# Patient Record
Sex: FEMALE | Race: WHITE | NOT HISPANIC OR LATINO | Employment: UNEMPLOYED | URBAN - NONMETROPOLITAN AREA
[De-identification: names, ages, dates, MRNs, and addresses within clinical notes are randomized per-mention and may not be internally consistent; named-entity substitution may affect disease eponyms.]

---

## 2018-06-12 ENCOUNTER — TELEMEDICINE ORIGINATING SITE VISIT (OUTPATIENT)
Dept: MEDICAL GROUP | Facility: CLINIC | Age: 53
End: 2018-06-12
Payer: MEDICAID

## 2018-06-12 ENCOUNTER — TELEMEDICINE2 (OUTPATIENT)
Dept: MEDICAL GROUP | Facility: PHYSICIAN GROUP | Age: 53
End: 2018-06-12
Payer: MEDICAID

## 2018-06-12 VITALS
HEIGHT: 62 IN | SYSTOLIC BLOOD PRESSURE: 103 MMHG | WEIGHT: 127 LBS | OXYGEN SATURATION: 91 % | BODY MASS INDEX: 23.37 KG/M2 | HEART RATE: 99 BPM | DIASTOLIC BLOOD PRESSURE: 70 MMHG | RESPIRATION RATE: 16 BRPM | TEMPERATURE: 97.5 F

## 2018-06-12 DIAGNOSIS — R63.5 WEIGHT GAIN: ICD-10-CM

## 2018-06-12 DIAGNOSIS — Z78.0 MENOPAUSE: ICD-10-CM

## 2018-06-12 DIAGNOSIS — Z00.00 HEALTHCARE MAINTENANCE: ICD-10-CM

## 2018-06-12 DIAGNOSIS — J04.0 LARYNGITIS: ICD-10-CM

## 2018-06-12 DIAGNOSIS — I10 ESSENTIAL HYPERTENSION: ICD-10-CM

## 2018-06-12 DIAGNOSIS — R42 LIGHTHEADEDNESS: ICD-10-CM

## 2018-06-12 PROCEDURE — 99204 OFFICE O/P NEW MOD 45 MIN: CPT | Performed by: NURSE PRACTITIONER

## 2018-06-12 RX ORDER — LISINOPRIL AND HYDROCHLOROTHIAZIDE 25; 20 MG/1; MG/1
1 TABLET ORAL DAILY
COMMUNITY
End: 2018-09-07 | Stop reason: SDUPTHER

## 2018-06-12 RX ORDER — AMOXICILLIN 500 MG/1
500 CAPSULE ORAL 3 TIMES DAILY
Qty: 30 CAP | Refills: 0 | OUTPATIENT
Start: 2018-06-12 | End: 2018-10-15

## 2018-06-12 ASSESSMENT — PATIENT HEALTH QUESTIONNAIRE - PHQ9
CLINICAL INTERPRETATION OF PHQ2 SCORE: 6
SUM OF ALL RESPONSES TO PHQ QUESTIONS 1-9: 14
5. POOR APPETITE OR OVEREATING: 2 - MORE THAN HALF THE DAYS

## 2018-06-12 NOTE — ASSESSMENT & PLAN NOTE
Patient states that she feels hungry all the time, and if she does not eat she will become lightheaded.  She has had weight gain.  And just feeling that she is not herself.

## 2018-06-12 NOTE — ASSESSMENT & PLAN NOTE
Patient is due for mammogram and Pap smear and has never had a colonoscopy willing to do a fit test though.

## 2018-06-12 NOTE — PROGRESS NOTES
Chief Complaint   Patient presents with   • Laryngitis       HISTORY OF PRESENT ILLNESS: Patient is a 52 y.o. female [facility]  Patient, who presents today to discuss:  Verified Identification with patient.  Secured video conference with RN presenter in [place], [place]        Essential hypertension  Patient was identified as having hypertension was treated with lisinopril hydrochlorothiazide, but did not go back for labs and no more refills of her medication were authorized.Symptoms:  Headache no , Weakness no, Visual loss , Chest pain no, Dyspnea no, Claudication no swelling no  Taking medication lisinopril - Hctz  Diet - standard  diet   Exercise - limited Bia Perrin APRBANG       Laryngitis  Patient states that she has had on and off laryngitis for months.  But most recently the laryngitis has turned into a burning fiery pain in the back of her throat causing her to lose her voice and having difficulty swallowing.  Patient is a current smoker for over 30 years.  She was once told that it was purely allergies so she has been taking Claritin on a daily basis    Weight gain  Patient is quite concerned that she has had a 10 pound weight gain over the last year and her eating habits have not changed.    Healthcare maintenance  Patient is due for mammogram and Pap smear and has never had a colonoscopy willing to do a fit test though.    Lightheadedness  Patient states that she feels hungry all the time, and if she does not eat she will become lightheaded.  She has had weight gain.  And just feeling that she is not herself.        Allergies:Erythromycin and Prednisone    Current Outpatient Prescriptions Ordered in [facility]   Medication Sig Dispense Refill   • lisinopril-hydrochlorothiazide (PRINZIDE, ZESTORETIC) 20-25 MG per tablet Take 1 Tab by mouth every day.     • amoxicillin (AMOXIL) 500 MG Cap Take 1 Cap by mouth 3 times a day. 30 Cap 0     No current Epic-ordered facility-administered medications on file.        Past  "Medical History:   Diagnosis Date   • Hypertension        Social History   Substance Use Topics   • Smoking status: Current Every Day Smoker     Packs/day: 1.00     Years: 35.00     Types: Cigarettes   • Smokeless tobacco: Never Used   • Alcohol use 1.2 oz/week     2 Shots of liquor per week       Family Status   Relation Status   • Mother    • Father      Family History   Problem Relation Age of Onset   • Cancer Mother      lung    • Cancer Father      brain        Review of Systems:  Allergic/Immunologic : Denies persistent infections, strong allergic reactions or hives  Cardiovascular: Denies chest pain or irregular heartbeat and palpitations, syncope HAVING LIGHTHEADINESS  Constitutional: Denies fever, fatigue, loss of appetite, weight gain, weight loss.  ENMT: Denies nosebleeds sore throat postnasal drip choking episodes.  Severe laryngitis and burning of the throat  Endocrine: Denies excessive thirst, hair loss, heat intolerance  Eyes: Denies yellow discoloration or visual changes  Gastrointestinal: Denies heartburn dysphasia abdominal pain, nausea, vomiting, abdominal swelling, change in bowel habits, constipation, diarrhea, fecal incontinence, rectal bleeding, gas, or jaundice  Sexual: Erectile dysfunction, decreased libido painful intercourse.  Hematologic/lymphatic denies easy bruising or prolonged bleeding  Musculoskeletal: Denies joint pain, muscle weakness daily use of over-the-counter medications for pain or prescription medications for pain  Psychiatry: Denies anxiety or depression or treatment for mental health disorder  Respiratory: Denies cough, dyspnea, hemoptysis, or wheezing        Exam:  Blood pressure 103/70, pulse 99, temperature 36.4 °C (97.5 °F), resp. rate 16, height 1.575 m (5' 2\"), weight 57.6 kg (127 lb), SpO2 91 %.  General:  Well nourished, well developed female in NAD  Communication: Conversation is appropriate  HEENT: Eyes conjunctiva is clear, lids without ptosis, " pupils equal round and reactive to light and accommodation.  Ears normal shape and contour, canals are clear bilaterally, TMs with good light reflex and appear normal.  Nasal mucosa pale and edematous with clear rhinorrhea.  Oropharynx benign, uvula extremely swollen and red.  Sinuses (frontal and maxillary) nontender to palpation.  Neck: Supple. Full range of motion is present bilateral lymphadenopathy is present nontender  Thyroid: symmetric  Respiratory: Effort-normal respiratory effort  Auscultation with telesteth: Normal breath sounds, no rubs, wheezes or rhonchi  Cardiovascular: Regular rate and rhythm without murmur. S1-S2 heard  Peripheral: No edema, varicosities or cyanosis in lower extremities  Extremities: no clubbing, cyanosis, edema, petechiae  Psych: Alert and oriented x3. Normal mood and affect.   SKIN: No rashes, ulcers or icterus  Palpation: No nodules or masses  Neurological: No focal deficits      Please note that this dictation was created using voice recognition software. I have made every reasonable attempt to correct obvious errors, but I expect that there are errors of grammar and possibly content that I did not discover before finalizing the note.    Assessment/Plan:  1. Essential hypertension - Current status of condition is chronic and controlled on therapy.   LIPID PANEL    VITAMIN D,25 HYDROXY    COMP METABOLIC PANEL    HEMOGLOBIN A1C   2. Weight gain - obtain labs TSH+FREE T4   3. Laryngitis - Doubt infection, but will treat with Amoxicillin, if not resolved will need to see ENT CBC WITH DIFFERENTIAL   4. Menopause     5. Healthcare maintenance  OCCULT BLOOD FECES IMMUNOASSAY   6. Lightheadedness  Labs      return to discuss lab results.

## 2018-06-12 NOTE — ASSESSMENT & PLAN NOTE
Patient is quite concerned that she has had a 10 pound weight gain over the last year and her eating habits have not changed.

## 2018-06-12 NOTE — ASSESSMENT & PLAN NOTE
Patient states that she has had on and off laryngitis for months.  But most recently the laryngitis has turned into a burning fiery pain in the back of her throat causing her to lose her voice and having difficulty swallowing.  Patient is a current smoker for over 30 years.  She was once told that it was purely allergies so she has been taking Claritin on a daily basis

## 2018-06-12 NOTE — ASSESSMENT & PLAN NOTE
Patient was identified as having hypertension was treated with lisinopril hydrochlorothiazide, but did not go back for labs and no more refills of her medication were authorized.Symptoms:  Headache no , Weakness no, Visual loss , Chest pain no, Dyspnea no, Claudication no swelling no  Taking medication lisinopril - Hctz  Diet - standard  diet   Exercise - limited Bia BRYANT

## 2018-06-13 ENCOUNTER — HOSPITAL ENCOUNTER (OUTPATIENT)
Facility: MEDICAL CENTER | Age: 53
End: 2018-06-13
Attending: NURSE PRACTITIONER
Payer: MEDICAID

## 2018-06-13 ENCOUNTER — NON-PROVIDER VISIT (OUTPATIENT)
Dept: MEDICAL GROUP | Facility: CLINIC | Age: 53
End: 2018-06-13
Payer: MEDICAID

## 2018-06-13 DIAGNOSIS — R42 LIGHTHEADEDNESS: ICD-10-CM

## 2018-06-13 PROCEDURE — 36415 COLL VENOUS BLD VENIPUNCTURE: CPT | Performed by: NURSE PRACTITIONER

## 2018-06-13 PROCEDURE — 82274 ASSAY TEST FOR BLOOD FECAL: CPT

## 2018-06-13 NOTE — NON-PROVIDER
Violetaken Sky is a 52 y.o. female here for a non-provider visit for venipuncture.    If abnormal was an in office provider notified today (if so, indicate provider)? Yes  Routed to PCP? Yes

## 2018-06-19 ENCOUNTER — TELEMEDICINE2 (OUTPATIENT)
Dept: URGENT CARE | Facility: CLINIC | Age: 53
End: 2018-06-19
Payer: MEDICAID

## 2018-06-19 ENCOUNTER — NON-PROVIDER VISIT (OUTPATIENT)
Dept: MEDICAL GROUP | Facility: CLINIC | Age: 53
End: 2018-06-19
Payer: MEDICAID

## 2018-06-19 ENCOUNTER — TELEMEDICINE ORIGINATING SITE VISIT (OUTPATIENT)
Dept: MEDICAL GROUP | Facility: CLINIC | Age: 53
End: 2018-06-19
Payer: MEDICAID

## 2018-06-19 VITALS
OXYGEN SATURATION: 94 % | WEIGHT: 128 LBS | BODY MASS INDEX: 23.55 KG/M2 | DIASTOLIC BLOOD PRESSURE: 92 MMHG | HEART RATE: 90 BPM | TEMPERATURE: 97.5 F | SYSTOLIC BLOOD PRESSURE: 147 MMHG | HEIGHT: 62 IN | RESPIRATION RATE: 16 BRPM

## 2018-06-19 DIAGNOSIS — J04.0 LARYNGITIS: ICD-10-CM

## 2018-06-19 DIAGNOSIS — J02.9 SORE THROAT: ICD-10-CM

## 2018-06-19 DIAGNOSIS — R59.9 SWOLLEN LYMPH NODES: ICD-10-CM

## 2018-06-19 DIAGNOSIS — R05.3 PERSISTENT DRY COUGH: ICD-10-CM

## 2018-06-19 DIAGNOSIS — R09.82 PND (POST-NASAL DRIP): ICD-10-CM

## 2018-06-19 LAB
HETEROPH AB SER QL LA: NEGATIVE
INT CON NEG: NEGATIVE
INT CON NEG: NEGATIVE
INT CON POS: POSITIVE
INT CON POS: POSITIVE
S PYO AG THROAT QL: NEGATIVE

## 2018-06-19 PROCEDURE — 99214 OFFICE O/P EST MOD 30 MIN: CPT | Performed by: NURSE PRACTITIONER

## 2018-06-19 PROCEDURE — 86308 HETEROPHILE ANTIBODY SCREEN: CPT | Performed by: NURSE PRACTITIONER

## 2018-06-19 PROCEDURE — 87880 STREP A ASSAY W/OPTIC: CPT | Performed by: NURSE PRACTITIONER

## 2018-06-19 RX ORDER — FLUTICASONE PROPIONATE 50 MCG
2 SPRAY, SUSPENSION (ML) NASAL DAILY
Qty: 1 BOTTLE | Refills: 0 | Status: SHIPPED | OUTPATIENT
Start: 2018-06-19 | End: 2018-10-17 | Stop reason: SDUPTHER

## 2018-06-19 RX ORDER — DEXTROMETHORPHAN HYDROBROMIDE AND PROMETHAZINE HYDROCHLORIDE 15; 6.25 MG/5ML; MG/5ML
5 SYRUP ORAL 2 TIMES DAILY PRN
Qty: 120 ML | Refills: 0 | Status: SHIPPED | OUTPATIENT
Start: 2018-06-19 | End: 2018-07-01

## 2018-06-19 ASSESSMENT — ENCOUNTER SYMPTOMS
NAUSEA: 0
VOMITING: 0
HEADACHES: 0
SORE THROAT: 1
COUGH: 1
CONSTIPATION: 0
MYALGIAS: 0
NECK PAIN: 0
WEAKNESS: 0
FEVER: 0
PALPITATIONS: 0
SPUTUM PRODUCTION: 0
EYE REDNESS: 0
ABDOMINAL PAIN: 0
CHILLS: 0
ORTHOPNEA: 0
EYE DISCHARGE: 0
DIZZINESS: 0
SHORTNESS OF BREATH: 0
SINUS PAIN: 0
DIARRHEA: 0
WHEEZING: 0

## 2018-06-19 ASSESSMENT — PAIN SCALES - GENERAL: PAINLEVEL: 7=MODERATE-SEVERE PAIN

## 2018-06-19 NOTE — PROGRESS NOTES
"Subjective:      Violeta Sky is a 52 y.o. female who presents with Pharyngitis            HPI  Violeta is here for sore throat. Was seen via telemedicine in [place]. Was seen by Dr. Bia Perrin on 6/12/18 and placed on Amoxi for possible throat infection. States no improvement of sore throat but has gotten her voice back. Denies fevers, but will get \"night sweats\". Smoker. C/o neck swelling at lymph nodes on both side of neck, states \"feels like may whole neck is swollen\". Denies nasal congestion, facial/ear pressure. Takes daily allergy med. States able to drink/eat with no problems but finds herself \"hungry all the time\" and \"hurts to swallow\". Denies SOB but has cough which keeps her up at night. H/o deviated septum but able to breathe through nose with no problems.     PMH:  has a past medical history of Hypertension.  MEDS:   Current Outpatient Prescriptions:   •  promethazine-dextromethorphan (PROMETHAZINE-DM) 6.25-15 MG/5ML syrup, Take 5 mL by mouth 2 times a day as needed for Cough for up to 12 days. Causes drowsiness, do not drive or work while using., Disp: 120 mL, Rfl: 0  •  fluticasone (FLONASE) 50 MCG/ACT nasal spray, Spray 2 Sprays in nose every day., Disp: 1 Bottle, Rfl: 0  •  lidocaine viscous 2% (XYLOCAINE) 2 % Solution, Take 15 mL by mouth 4 times a day as needed for Throat/Mouth Pain for up to 5 days. Gargle and spit out., Disp: 300 mL, Rfl: 0  •  lisinopril-hydrochlorothiazide (PRINZIDE, ZESTORETIC) 20-25 MG per tablet, Take 1 Tab by mouth every day., Disp: , Rfl:   •  amoxicillin (AMOXIL) 500 MG Cap, Take 1 Cap by mouth 3 times a day., Disp: 30 Cap, Rfl: 0  ALLERGIES:   Allergies   Allergen Reactions   • Erythromycin      abd pain   • Prednisone Unspecified     hallucinations     SURGHX:   Past Surgical History:   Procedure Laterality Date   • CARPAL TUNNEL RELEASE     • PRIMARY C SECTION       SOCHX:  reports that she has been smoking Cigarettes.  She has a 35.00 pack-year smoking " "history. She has never used smokeless tobacco. She reports that she drinks about 1.2 oz of alcohol per week . She reports that she does not use drugs.  FH: Family history was reviewed, no pertinent findings to report    Review of Systems   Constitutional: Negative for chills, fever and malaise/fatigue.   HENT: Positive for sore throat. Negative for congestion, ear pain and sinus pain.    Eyes: Negative for discharge and redness.   Respiratory: Positive for cough. Negative for sputum production, shortness of breath and wheezing.    Cardiovascular: Negative for chest pain, palpitations and orthopnea.   Gastrointestinal: Negative for abdominal pain, constipation, diarrhea, nausea and vomiting.   Musculoskeletal: Negative for myalgias and neck pain.   Skin: Negative for itching and rash.   Neurological: Negative for dizziness, weakness and headaches.   Endo/Heme/Allergies: Positive for environmental allergies.   All other systems reviewed and are negative.         Objective:     /92   Pulse 90   Temp 36.4 °C (97.5 °F)   Resp 16   Ht 1.575 m (5' 2\")   Wt 58.1 kg (128 lb)   SpO2 94%   BMI 23.41 kg/m²      Physical Exam   Constitutional: She is oriented to person, place, and time. Vital signs are normal. She appears well-developed and well-nourished. She is active and cooperative.  Non-toxic appearance. She does not have a sickly appearance. She does not appear ill. No distress.   HENT:   Head: Normocephalic.   Right Ear: External ear and ear canal normal. Tympanic membrane is injected.   Left Ear: External ear and ear canal normal. Tympanic membrane is injected.   Nose: Mucosal edema present. No rhinorrhea or sinus tenderness.   Mouth/Throat: Uvula is midline. Mucous membranes are dry. No uvula swelling. Posterior oropharyngeal erythema present. No posterior oropharyngeal edema.   Eyes: Conjunctivae and EOM are normal. Pupils are equal, round, and reactive to light.   Neck: Normal range of motion. No spinous " process tenderness and no muscular tenderness present. No neck rigidity. No edema, no erythema and normal range of motion present. No thyromegaly present.   Able to turn head with no difficulty of pain.   Cardiovascular: Normal rate.    Pulmonary/Chest: Effort normal. No accessory muscle usage. No respiratory distress.   Musculoskeletal: Normal range of motion.   Lymphadenopathy:        Head (right side): Submandibular and tonsillar adenopathy present.        Head (left side): Submandibular and tonsillar adenopathy present.     She has no cervical adenopathy.   TTP at submandibular/tonsillar lymph nodes.    Neurological: She is alert and oriented to person, place, and time.   Skin: Skin is warm and dry. She is not diaphoretic.   Psychiatric: She has a normal mood and affect. Her speech is normal and behavior is normal. Judgment and thought content normal. She is not actively hallucinating. Cognition and memory are normal. She is attentive.   Vitals reviewed.              Assessment/Plan:     1. Sore throat    - POCT Rapid Strep A: NEG  - POCT Mononucleosis (mono): NEG  - REFERRAL TO ENT  - lidocaine viscous 2% (XYLOCAINE) 2 % Solution; Take 15 mL by mouth 4 times a day as needed for Throat/Mouth Pain for up to 5 days. Gargle and spit out.  Dispense: 300 mL; Refill: 0    2. Swollen lymph nodes    - REFERRAL TO ENT    3. Persistent dry cough    - promethazine-dextromethorphan (PROMETHAZINE-DM) 6.25-15 MG/5ML syrup; Take 5 mL by mouth 2 times a day as needed for Cough for up to 12 days. Causes drowsiness, do not drive or work while using.  Dispense: 120 mL; Refill: 0    4. PND (post-nasal drip)    - fluticasone (FLONASE) 50 MCG/ACT nasal spray; Spray 2 Sprays in nose every day.  Dispense: 1 Bottle; Refill: 0    Increase water intake  May use Ibuprofen/Tylenol prn for any fever, body aches or throat pain  May take long acting antihistamine for seasonal allergy symptoms prn  May use saline nasal spray/saud pot for  nasal congestion prn  May use Flonase prn for nasal congestion  May use throat lozenges for throat discomfort  May gargle with salt water prn for throat discomfort  May drink smoothies for nutrition if too painful to swallow solid foods  Monitor for any sinus pain/pressure with sinus congestion with thick mucus production, productive cough, SOB and chest pain/tightness, fever- need re-evaluation  Follow up with ENT

## 2018-06-20 ENCOUNTER — TELEPHONE (OUTPATIENT)
Dept: MEDICAL GROUP | Facility: PHYSICIAN GROUP | Age: 53
End: 2018-06-20

## 2018-06-20 DIAGNOSIS — E55.9 VITAMIN D DEFICIENCY: ICD-10-CM

## 2018-06-20 DIAGNOSIS — R74.8 ELEVATED LIVER ENZYMES: ICD-10-CM

## 2018-06-20 NOTE — TELEPHONE ENCOUNTER
More labs to follow: she has elevated liver enzymes specifically AST and will need follow up.  Low vitamin D - She needs to supplement with 2K a day.

## 2018-06-20 NOTE — TELEPHONE ENCOUNTER
Please call patient.   Her labs are reviewed.  She has normal levels. I do agree with her having a visit with ENT due to continued burning in throat.  Bia Perrin

## 2018-06-24 DIAGNOSIS — Z00.00 HEALTHCARE MAINTENANCE: ICD-10-CM

## 2018-06-26 ENCOUNTER — TELEPHONE (OUTPATIENT)
Dept: MEDICAL GROUP | Facility: PHYSICIAN GROUP | Age: 53
End: 2018-06-26

## 2018-06-26 LAB — HEMOCCULT STL QL IA: POSITIVE

## 2018-06-28 ENCOUNTER — TELEPHONE (OUTPATIENT)
Dept: MEDICAL GROUP | Facility: PHYSICIAN GROUP | Age: 53
End: 2018-06-28

## 2018-06-28 PROBLEM — R19.5 POSITIVE FIT (FECAL IMMUNOCHEMICAL TEST): Status: ACTIVE | Noted: 2018-06-28

## 2018-06-28 NOTE — TELEPHONE ENCOUNTER
Patient has positive FIT test.  She needs follow up. Both for labs and now FIT TEST.  Will dictate letter.  Bia Perrin

## 2018-06-28 NOTE — LETTER
June 28, 2018        Violeta Sky  [address]  [address] [place] [address]        Dear Violeta:    We missed you at your appointment.    There are two things you need to discuss with me - Changes in your labs and now your stool test is positive for blood.    I want to discuss with you recommendations.    Please make appointment for follow up.     If you have any questions or concerns, please don't hesitate to call.        Sincerely,        WALLACE Alba.    Electronically Signed

## 2018-07-03 ENCOUNTER — APPOINTMENT (OUTPATIENT)
Dept: URGENT CARE | Facility: CLINIC | Age: 53
End: 2018-07-03
Payer: MEDICAID

## 2018-07-24 ENCOUNTER — TELEMEDICINE ORIGINATING SITE VISIT (OUTPATIENT)
Dept: MEDICAL GROUP | Facility: CLINIC | Age: 53
End: 2018-07-24
Payer: MEDICAID

## 2018-07-24 ENCOUNTER — TELEMEDICINE2 (OUTPATIENT)
Dept: MEDICAL GROUP | Facility: PHYSICIAN GROUP | Age: 53
End: 2018-07-24
Payer: MEDICAID

## 2018-07-24 VITALS
HEART RATE: 91 BPM | RESPIRATION RATE: 16 BRPM | OXYGEN SATURATION: 96 % | WEIGHT: 128 LBS | DIASTOLIC BLOOD PRESSURE: 101 MMHG | BODY MASS INDEX: 23.55 KG/M2 | HEIGHT: 62 IN | TEMPERATURE: 98.5 F | SYSTOLIC BLOOD PRESSURE: 139 MMHG

## 2018-07-24 DIAGNOSIS — J04.0 LARYNGITIS: ICD-10-CM

## 2018-07-24 DIAGNOSIS — R19.5 POSITIVE FIT (FECAL IMMUNOCHEMICAL TEST): ICD-10-CM

## 2018-07-24 DIAGNOSIS — Z00.00 HEALTHCARE MAINTENANCE: ICD-10-CM

## 2018-07-24 DIAGNOSIS — R74.8 ELEVATED LIVER ENZYMES: ICD-10-CM

## 2018-07-24 PROCEDURE — 99214 OFFICE O/P EST MOD 30 MIN: CPT | Performed by: NURSE PRACTITIONER

## 2018-07-24 NOTE — ASSESSMENT & PLAN NOTE
Patient was referred to ENT. She had a CT done in [place] and was sent a letter to be seen by specialist. Patient continue

## 2018-07-24 NOTE — ASSESSMENT & PLAN NOTE
Patient had positive fit test. She states she has periodic changes in her stool. The stool will be dark and black at times. No family history of colon cancer. Has not had colonoscopy.

## 2018-09-07 DIAGNOSIS — I10 ESSENTIAL HYPERTENSION: ICD-10-CM

## 2018-09-07 RX ORDER — LISINOPRIL AND HYDROCHLOROTHIAZIDE 25; 20 MG/1; MG/1
1 TABLET ORAL DAILY
Qty: 30 TAB | Refills: 5 | Status: SHIPPED | OUTPATIENT
Start: 2018-09-07 | End: 2018-11-14 | Stop reason: SDUPTHER

## 2018-09-07 NOTE — TELEPHONE ENCOUNTER
Was the patient seen in the last year in this department? Yes    Does patient have an active prescription for medications requested? Yes    Received Request Via: Pharmacy     Requested Prescriptions     Pending Prescriptions Disp Refills   • lisinopril-hydrochlorothiazide (PRINZIDE, ZESTORETIC) 20-25 MG per tablet 30 Tab 0     Sig: Take 1 Tab by mouth every day.

## 2018-09-13 ENCOUNTER — TELEMEDICINE2 (OUTPATIENT)
Dept: MEDICAL GROUP | Age: 53
End: 2018-09-13
Payer: MEDICAID

## 2018-09-13 ENCOUNTER — TELEMEDICINE ORIGINATING SITE VISIT (OUTPATIENT)
Dept: MEDICAL GROUP | Facility: CLINIC | Age: 53
End: 2018-09-13
Payer: MEDICAID

## 2018-09-13 VITALS
OXYGEN SATURATION: 94 % | HEART RATE: 87 BPM | RESPIRATION RATE: 16 BRPM | SYSTOLIC BLOOD PRESSURE: 120 MMHG | TEMPERATURE: 97.1 F | HEIGHT: 62 IN | DIASTOLIC BLOOD PRESSURE: 89 MMHG | BODY MASS INDEX: 22.82 KG/M2 | WEIGHT: 124 LBS

## 2018-09-13 DIAGNOSIS — K85.20 ALCOHOL-INDUCED ACUTE PANCREATITIS WITHOUT INFECTION OR NECROSIS: ICD-10-CM

## 2018-09-13 DIAGNOSIS — F10.10 ETOH ABUSE: ICD-10-CM

## 2018-09-13 DIAGNOSIS — F10.982 ALCOHOL-INDUCED INSOMNIA (HCC): ICD-10-CM

## 2018-09-13 DIAGNOSIS — F33.1 MODERATE EPISODE OF RECURRENT MAJOR DEPRESSIVE DISORDER (HCC): ICD-10-CM

## 2018-09-13 PROCEDURE — 99214 OFFICE O/P EST MOD 30 MIN: CPT | Performed by: INTERNAL MEDICINE

## 2018-09-13 RX ORDER — TRAZODONE HYDROCHLORIDE 50 MG/1
TABLET ORAL
Qty: 60 TAB | Refills: 3 | Status: SHIPPED | OUTPATIENT
Start: 2018-09-13

## 2018-09-13 RX ORDER — VENLAFAXINE HYDROCHLORIDE 75 MG/1
75 CAPSULE, EXTENDED RELEASE ORAL DAILY
Qty: 30 CAP | Refills: 3 | Status: SHIPPED | OUTPATIENT
Start: 2018-09-13 | End: 2018-11-14 | Stop reason: SDUPTHER

## 2018-09-13 NOTE — PROGRESS NOTES
CC: Hospital follow-up visit    Verified identification with patient. Secured video conference with RN presenter in [facility]      HPI:   Violeta presents today with the following.  Patient of Bia Perrin    1. Alcohol-induced acute pancreatitis without infection or necrosis  Patient was admitted to [facility] 16 days ago with severe abdominal pain, bloating, nausea, vomiting, shaking. Patient was diagnosed with - alcoholinduced pancreatitis. She has a long history of alcohol abuse and remained in the hospital for a 3 day detox. Patient was discharged with instructions to keep a liquid diet and to change to solid foods within a week. She completed her outpatient antibiotic. Patient is doing well and is now tolerating meals. Patient denies any GI symptoms. Patient has a follow-up with gastroenterology via telemedicine this coming Tuesday.    2. ETOH abuse  Long history of alcohol abuse. Patient restarted drinking after years of sobriety in 2007. She is now 16 days sober and doing well since her hospitalization. She goes to AA meetings daily and has a sponsor. [facility] and has good support from friends. She discontinued the Librium given to her at discharge.    3. Moderate episode of recurrent major depressive disorder (HCC)  Patient has abstained from alcohol, her depression has recurred and it is moderate in severity. Patient was taking Effexor in the past for her depression which she tolerated. Patient has tried numerous antidepressants Bolesta with side effects. She would like to restart Effexor. Denies SI/HI. Patient is excited about a future grandbaby on the way.    4. Alcohol-induced insomnia (HCC)  Patient was discharged with trazodone to use daily at bedtime when necessary for insomnia. Patient ran out of her trazodone this week. Patient is requesting a refill to help her sleep.      Patient Active Problem List    Diagnosis Date Noted   •  "Alcohol-induced acute pancreatitis without infection or necrosis 09/13/2018   • ETOH abuse 09/13/2018   • Moderate episode of recurrent major depressive disorder (HCC) 09/13/2018   • Positive FIT (fecal immunochemical test) 06/28/2018   • Elevated liver enzymes 06/20/2018   • Vitamin D deficiency 06/20/2018   • Essential hypertension 06/12/2018   • Weight gain 06/12/2018   • Laryngitis 06/12/2018   • Menopause 06/12/2018   • Healthcare maintenance 06/12/2018   • Lightheadedness 06/12/2018       Current Outpatient Prescriptions   Medication Sig Dispense Refill   • traZODone (DESYREL) 50 MG Tab Take 1-2 tablets at bedtime as needed 60 Tab 3   • venlafaxine XR (EFFEXOR XR) 75 MG CAPSULE SR 24 HR Take 1 Cap by mouth every day. 30 Cap 3   • lisinopril-hydrochlorothiazide (PRINZIDE, ZESTORETIC) 20-25 MG per tablet Take 1 Tab by mouth every day. 30 Tab 5   • fluticasone (FLONASE) 50 MCG/ACT nasal spray Spray 2 Sprays in nose every day. 1 Bottle 0   • amoxicillin (AMOXIL) 500 MG Cap Take 1 Cap by mouth 3 times a day. 30 Cap 0     No current facility-administered medications for this visit.          Allergies as of 09/13/2018 - Reviewed 09/13/2018   Allergen Reaction Noted   • Erythromycin  06/12/2018   • Prednisone Unspecified 06/12/2018        ROS: As per HPI. Denies all other cardiopulmonary, GI, neurologic symptoms.    /89   Pulse 87   Temp 36.2 °C (97.1 °F)   Resp 16   Ht 1.575 m (5' 2\")   Wt 56.2 kg (124 lb)   SpO2 94%   BMI 22.68 kg/m²     Physical Exam:  Gen:         Alert and oriented, No apparent distress. Bit anxious, tearful.  Neck:        No Lymphadenopathy or Bruits. No thyromegaly.  Lungs:     Clear to auscultation bilaterally, no wheezes rhonchi or crackles  CV:          Regular rate and rhythm. No murmurs, rubs or gallops.  Abd:         Soft , non distended. Tenderness to deep palpation in left upper quadrant, no rebounding. Normal active bowel sounds.  No  Hepatosplenomegaly, No pulsatile " masses.                   Ext:          No clubbing, cyanosis, edema.      Assessment and Plan.   53 y.o. female with the following issues.    1. Alcohol-induced acute pancreatitis without infection or necrosis  Release of information from [facility] for review  Keep appointment with GI via telemedicine  Patient is stable, advancing diet    2. ETOH abuse  Offered counseling today  Patient will continue with regular AA meetings daily  Stable, good support system    3. Moderate episode of recurrent major depressive disorder (HCC)  Start Effexor XR 75 mg daily  RTC 3 weeks    - traZODone (DESYREL) 50 MG Tab; Take 1-2 tablets at bedtime as needed  Dispense: 60 Tab; Refill: 3  - venlafaxine XR (EFFEXOR XR) 75 MG CAPSULE SR 24 HR; Take 1 Cap by mouth every day.  Dispense: 30 Cap; Refill: 3    4. Alcohol-induced insomnia (HCC)    - traZODone (DESYREL) 50 MG Tab; Take 1-2 tablets at bedtime as needed  Dispense: 60 Tab; Refill: 3            Please note that this dictation was created using voice recognition software. I have made every reasonable attempt to correct obvious errors, but expect that there are errors of grammar and possible content that I did not discover before finalizing note.

## 2018-09-18 ENCOUNTER — TELEMEDICINE ORIGINATING SITE VISIT (OUTPATIENT)
Dept: MEDICAL GROUP | Facility: CLINIC | Age: 53
End: 2018-09-18
Payer: MEDICAID

## 2018-09-18 ENCOUNTER — APPOINTMENT (OUTPATIENT)
Dept: SCHEDULING | Facility: IMAGING CENTER | Age: 53
End: 2018-09-18
Payer: MEDICAID

## 2018-09-18 DIAGNOSIS — R74.8 ELEVATED LIVER ENZYMES: ICD-10-CM

## 2018-10-15 ENCOUNTER — TELEMEDICINE ORIGINATING SITE VISIT (OUTPATIENT)
Dept: MEDICAL GROUP | Facility: CLINIC | Age: 53
End: 2018-10-15
Payer: MEDICAID

## 2018-10-15 ENCOUNTER — NON-PROVIDER VISIT (OUTPATIENT)
Dept: MEDICAL GROUP | Facility: CLINIC | Age: 53
End: 2018-10-15
Payer: MEDICAID

## 2018-10-15 ENCOUNTER — TELEMEDICINE2 (OUTPATIENT)
Dept: MEDICAL GROUP | Age: 53
End: 2018-10-15
Payer: MEDICAID

## 2018-10-15 VITALS
TEMPERATURE: 97.8 F | BODY MASS INDEX: 23 KG/M2 | HEART RATE: 87 BPM | HEIGHT: 62 IN | DIASTOLIC BLOOD PRESSURE: 84 MMHG | SYSTOLIC BLOOD PRESSURE: 125 MMHG | OXYGEN SATURATION: 96 % | WEIGHT: 125 LBS | RESPIRATION RATE: 16 BRPM

## 2018-10-15 DIAGNOSIS — Z91.09 ENVIRONMENTAL ALLERGIES: ICD-10-CM

## 2018-10-15 DIAGNOSIS — F10.10 ETOH ABUSE: ICD-10-CM

## 2018-10-15 DIAGNOSIS — I10 ESSENTIAL HYPERTENSION: ICD-10-CM

## 2018-10-15 DIAGNOSIS — K85.20 ALCOHOL-INDUCED ACUTE PANCREATITIS WITHOUT INFECTION OR NECROSIS: ICD-10-CM

## 2018-10-15 DIAGNOSIS — R09.82 PND (POST-NASAL DRIP): ICD-10-CM

## 2018-10-15 DIAGNOSIS — Z82.79 FAMILY HISTORY OF CONGENITAL HEART DISEASE: ICD-10-CM

## 2018-10-15 DIAGNOSIS — G47.09 OTHER INSOMNIA: ICD-10-CM

## 2018-10-15 DIAGNOSIS — R19.5 POSITIVE FIT (FECAL IMMUNOCHEMICAL TEST): ICD-10-CM

## 2018-10-15 PROCEDURE — 93000 ELECTROCARDIOGRAM COMPLETE: CPT | Performed by: INTERNAL MEDICINE

## 2018-10-15 PROCEDURE — 99214 OFFICE O/P EST MOD 30 MIN: CPT | Performed by: INTERNAL MEDICINE

## 2018-10-15 NOTE — PROGRESS NOTES
CC: Follow-up alcohol-induced pancreatitis    Verified identification with patient. Secured video conference with RN presenter in [facility]      HPI:   Violeta presents today with the following.    1. Alcohol-induced acute pancreatitis without infection or necrosis  Patient completed office visit with gastroenterologist. Patient was stable and doing quite well. Patient has been abstinent from alcohol since her hospitalization. Patient denies abdominal pain but is left with a bit of abdominal swelling which is improving. Patient has a good appetite, denies nausea, vomiting, diarrhea. It was recommended by gastroenterology to recheck CMP and CBC for elevated liver enzymes.    2. ETOH abuse  Patient is doing well with daily visits with Alcoholics Anonymous. She has good report from her friends which are also going to AA. Patient has been abstinent since discharge from the hospital.   Patient is also doing well with Effexor at current dose is 75 mg daily    3. Essential hypertension  Patient was stable on her current dose of Zestoretic    4. Environmental allergies  Patient requesting prescription for Claritin as she cannot afford the over-the-counter medication. Requesting refill for Flonase and albuterol. With allergy season patient also has shortness of breath with cold exposure.    5. Family history of congenital heart disease  GI requesting cardiac clearance for colonoscopy  Patient has a remote history of CHF related to her hypertension and a family history of congenital heart disease which is unknown. Patient has never had a cardiac stress test or echocardiogram. She was last seen by a cardiologist 7 years ago.    6. Positive FIT (fecal immunochemical test)  GI recommended screening colonoscopy. No family history of colon cancer. GI requesting cardiology referral for cardiac clearance prior to procedure. Patient states he positive history of remote CHF in herself and family history of congenital heart  "disease.    7. Other insomnia  Requesting increase for trazodone to 100 mg tablets. She is able to initiate sleep but having difficulty staying asleep. Her      Patient Active Problem List    Diagnosis Date Noted   • Environmental allergies 10/15/2018   • Family history of congenital heart disease 10/15/2018   • Other insomnia 10/15/2018   • Alcohol-induced acute pancreatitis without infection or necrosis 09/13/2018   • ETOH abuse 09/13/2018   • Moderate episode of recurrent major depressive disorder (HCC) 09/13/2018   • Positive FIT (fecal immunochemical test) 06/28/2018   • Elevated liver enzymes 06/20/2018   • Vitamin D deficiency 06/20/2018   • Essential hypertension 06/12/2018   • Weight gain 06/12/2018   • Laryngitis 06/12/2018   • Menopause 06/12/2018   • Healthcare maintenance 06/12/2018   • Lightheadedness 06/12/2018       Current Outpatient Prescriptions   Medication Sig Dispense Refill   • traZODone (DESYREL) 50 MG Tab Take 1-2 tablets at bedtime as needed 60 Tab 3   • venlafaxine XR (EFFEXOR XR) 75 MG CAPSULE SR 24 HR Take 1 Cap by mouth every day. 30 Cap 3   • lisinopril-hydrochlorothiazide (PRINZIDE, ZESTORETIC) 20-25 MG per tablet Take 1 Tab by mouth every day. 30 Tab 5   • fluticasone (FLONASE) 50 MCG/ACT nasal spray Spray 2 Sprays in nose every day. 1 Bottle 0     No current facility-administered medications for this visit.          Allergies as of 10/15/2018 - Reviewed 10/15/2018   Allergen Reaction Noted   • Erythromycin  06/12/2018   • Prednisone Unspecified 06/12/2018        ROS: As per HPI. Denies all other cardiopulmonary, GI, neurologic symptoms    /84 (BP Location: Right arm, Patient Position: Sitting)   Pulse 87   Temp 36.6 °C (97.8 °F) (Temporal)   Resp 16   Ht 1.575 m (5' 2\")   Wt 56.7 kg (125 lb)   SpO2 96%   BMI 22.86 kg/m²     Physical Exam:  Gen:         Alert and oriented, No apparent distress.  Neck:        No Lymphadenopathy or Bruits.  Lungs:     Clear to auscultation " bilaterally, no wheezes rhonchi or crackles  CV:          Regular rate and rhythm. No murmurs, rubs or gallops.  Abd:         Soft non tender, non distended. Normal active bowel sounds.  No  Hepatosplenomegaly, No pulsatile masses.                   Ext:          No clubbing, cyanosis, edema.      Assessment and Plan.   53 y.o. female with the following issues.    1. Alcohol-induced acute pancreatitis without infection or necrosis  GI notes reviewed  Patient stable  Continue current plan of care    - CBC WITH DIFFERENTIAL; Future  - COMP METABOLIC PANEL; Future    2. ETOH abuse  Continue with AA meetings    3. Essential hypertension  EKG shows normal sinus rhythm, borderline R-wave progression, anterior. No ST segment elevation or depressions noted. No arrhythmias    - EKG - Clinic Performed    4. Environmental allergies  Prescription for Flonase, albuterol and Claritin    5. Family history of congenital heart disease  Referral to cardiology as requested per GI for cardiac clearance    - EKG - Clinic Performed    6. Positive FIT (fecal immunochemical test)  Patient will follow-up with GI for screening colonoscopy    7. Other insomnia  Trazodone 100 mg by mouth daily at bedtime              Please note that this dictation was created using voice recognition software. I have made every reasonable attempt to correct obvious errors, but expect that there are errors of grammar and possible content that I did not discover before finalizing note.

## 2018-10-17 ENCOUNTER — NON-PROVIDER VISIT (OUTPATIENT)
Dept: MEDICAL GROUP | Facility: CLINIC | Age: 53
End: 2018-10-17
Payer: MEDICAID

## 2018-10-17 DIAGNOSIS — K85.20 ALCOHOL-INDUCED ACUTE PANCREATITIS WITHOUT INFECTION OR NECROSIS: ICD-10-CM

## 2018-10-17 PROCEDURE — 36415 COLL VENOUS BLD VENIPUNCTURE: CPT | Performed by: NURSE PRACTITIONER

## 2018-10-17 RX ORDER — TRAZODONE HYDROCHLORIDE 100 MG/1
100 TABLET ORAL
Qty: 30 TAB | Refills: 3 | Status: SHIPPED | OUTPATIENT
Start: 2018-10-17 | End: 2018-11-14 | Stop reason: SDUPTHER

## 2018-10-17 RX ORDER — ALBUTEROL SULFATE 90 UG/1
2 AEROSOL, METERED RESPIRATORY (INHALATION) EVERY 6 HOURS PRN
Qty: 8.5 G | Refills: 3 | Status: SHIPPED | OUTPATIENT
Start: 2018-10-17 | End: 2018-11-14 | Stop reason: SDUPTHER

## 2018-10-17 RX ORDER — CETIRIZINE HYDROCHLORIDE 10 MG/1
10 TABLET ORAL DAILY
Qty: 30 TAB | Refills: 5 | Status: SHIPPED | OUTPATIENT
Start: 2018-10-17 | End: 2018-11-14 | Stop reason: SDUPTHER

## 2018-10-17 RX ORDER — FLUTICASONE PROPIONATE 50 MCG
2 SPRAY, SUSPENSION (ML) NASAL DAILY
Qty: 1 BOTTLE | Refills: 3 | Status: SHIPPED | OUTPATIENT
Start: 2018-10-17 | End: 2018-11-14 | Stop reason: SDUPTHER

## 2018-10-17 NOTE — NON-PROVIDER
Violeta Annetta Sky is a 53 y.o. female here for a non-provider visit for Venipuncture    If abnormal was an in office provider notified today (if so, indicate provider)? Yes  Routed to PCP? Yes

## 2018-10-22 ENCOUNTER — TELEPHONE (OUTPATIENT)
Dept: MEDICAL GROUP | Facility: CLINIC | Age: 53
End: 2018-10-22

## 2018-10-22 DIAGNOSIS — R94.31 ABNORMAL EKG: ICD-10-CM

## 2018-10-22 DIAGNOSIS — Z82.49 FAMILY HISTORY OF HEART DISEASE: ICD-10-CM

## 2018-11-14 ENCOUNTER — TELEMEDICINE2 (OUTPATIENT)
Dept: MEDICAL GROUP | Age: 53
End: 2018-11-14
Payer: MEDICAID

## 2018-11-14 ENCOUNTER — TELEMEDICINE ORIGINATING SITE VISIT (OUTPATIENT)
Dept: MEDICAL GROUP | Facility: CLINIC | Age: 53
End: 2018-11-14
Payer: MEDICAID

## 2018-11-14 VITALS
TEMPERATURE: 99.8 F | WEIGHT: 128 LBS | HEIGHT: 62 IN | RESPIRATION RATE: 16 BRPM | BODY MASS INDEX: 23.55 KG/M2 | OXYGEN SATURATION: 97 % | SYSTOLIC BLOOD PRESSURE: 116 MMHG | DIASTOLIC BLOOD PRESSURE: 75 MMHG | HEART RATE: 85 BPM

## 2018-11-14 DIAGNOSIS — G47.09 OTHER INSOMNIA: ICD-10-CM

## 2018-11-14 DIAGNOSIS — R19.5 POSITIVE FIT (FECAL IMMUNOCHEMICAL TEST): ICD-10-CM

## 2018-11-14 DIAGNOSIS — K85.20 ALCOHOL-INDUCED ACUTE PANCREATITIS WITHOUT INFECTION OR NECROSIS: ICD-10-CM

## 2018-11-14 DIAGNOSIS — F33.1 MODERATE EPISODE OF RECURRENT MAJOR DEPRESSIVE DISORDER (HCC): ICD-10-CM

## 2018-11-14 DIAGNOSIS — Z79.899 MEDICATION MANAGEMENT: ICD-10-CM

## 2018-11-14 DIAGNOSIS — Z91.09 ENVIRONMENTAL ALLERGIES: ICD-10-CM

## 2018-11-14 DIAGNOSIS — J06.9 VIRAL UPPER RESPIRATORY TRACT INFECTION: ICD-10-CM

## 2018-11-14 DIAGNOSIS — I10 ESSENTIAL HYPERTENSION: ICD-10-CM

## 2018-11-14 DIAGNOSIS — R09.82 PND (POST-NASAL DRIP): ICD-10-CM

## 2018-11-14 PROCEDURE — 99214 OFFICE O/P EST MOD 30 MIN: CPT | Performed by: INTERNAL MEDICINE

## 2018-11-14 RX ORDER — TRAZODONE HYDROCHLORIDE 100 MG/1
100 TABLET ORAL
Qty: 90 TAB | Refills: 1 | Status: SHIPPED | OUTPATIENT
Start: 2018-11-14

## 2018-11-14 RX ORDER — CETIRIZINE HYDROCHLORIDE 10 MG/1
10 TABLET ORAL DAILY
Qty: 90 TAB | Refills: 1 | Status: SHIPPED | OUTPATIENT
Start: 2018-11-14

## 2018-11-14 RX ORDER — FLUTICASONE PROPIONATE 50 MCG
2 SPRAY, SUSPENSION (ML) NASAL DAILY
Qty: 3 BOTTLE | Refills: 1 | Status: SHIPPED | OUTPATIENT
Start: 2018-11-14

## 2018-11-14 RX ORDER — VENLAFAXINE HYDROCHLORIDE 75 MG/1
75 CAPSULE, EXTENDED RELEASE ORAL DAILY
Qty: 90 CAP | Refills: 1 | Status: SHIPPED | OUTPATIENT
Start: 2018-11-14

## 2018-11-14 RX ORDER — LISINOPRIL AND HYDROCHLOROTHIAZIDE 25; 20 MG/1; MG/1
1 TABLET ORAL DAILY
Qty: 90 TAB | Refills: 1 | Status: SHIPPED | OUTPATIENT
Start: 2018-11-14

## 2018-11-14 RX ORDER — ALBUTEROL SULFATE 90 UG/1
2 AEROSOL, METERED RESPIRATORY (INHALATION) EVERY 6 HOURS PRN
Qty: 3 INHALER | Refills: 1 | Status: SHIPPED | OUTPATIENT
Start: 2018-11-14

## 2018-11-14 NOTE — PROGRESS NOTES
CC: Follow-up lab work    Verified identification with patient. Secured video conference with RN presenter in [facility]      HPI:   Violeta presents today with the following.    1. Alcohol-induced acute pancreatitis without infection or necrosis  Patient completed office visit with gastroenterologist. Patient was stable and doing quite well. Patient has been abstinent from alcohol since her hospitalization. Patient denies abdominal pain but is left with a bit of abdominal swelling which is improving. Patient has a good appetite, denies nausea, vomiting, diarrhea. It was recommended by gastroenterology to recheck CMP and CBC for elevated liver enzymes.    Update 11/14/18: Patient completed lab work.  CBC, CMP within normal limits.  Patient remains sober, follows up with AA meetings daily.      2. Viral upper respiratory tract infection  Patient presents with a 3-4-day history of mildly productive cough.  Symptoms began 4 days ago with fevers, chills, body aches, slight headache and dizziness.  Mild shortness of breath.  Symptoms greatly improving over the last 2 days however cough remains.  Not taking anything over-the-counter except [place] cold and flu.  Patient did not receive her flu shot.    3. Medication management  Patient requesting 90-day refill on her medications.  She will be moving to [place] to be near her son.    4. Positive FIT (fecal immunochemical test)  Patient has not received cardiac clearance to undergo colonoscopy.  Patient will follow up with new physician in [place].  EKG available in media shows normal sinus rhythm, no ST segment elevations or depressions, no arrhythmias..      Patient Active Problem List    Diagnosis Date Noted   • Viral upper respiratory tract infection 11/14/2018   • Environmental allergies 10/15/2018   • Family history of congenital heart disease 10/15/2018   • Other insomnia 10/15/2018   • Alcohol-induced acute pancreatitis without infection or necrosis  "09/13/2018   • ETOH abuse 09/13/2018   • Moderate episode of recurrent major depressive disorder (HCC) 09/13/2018   • Positive FIT (fecal immunochemical test) 06/28/2018   • Elevated liver enzymes 06/20/2018   • Vitamin D deficiency 06/20/2018   • Essential hypertension 06/12/2018   • Weight gain 06/12/2018   • Laryngitis 06/12/2018   • Menopause 06/12/2018   • Healthcare maintenance 06/12/2018   • Lightheadedness 06/12/2018       Current Outpatient Prescriptions   Medication Sig Dispense Refill   • cetirizine (ZYRTEC) 10 MG Tab Take 1 Tab by mouth every day. 90 Tab 1   • fluticasone (FLONASE) 50 MCG/ACT nasal spray Spray 2 Sprays in nose every day. 3 Bottle 1   • albuterol 108 (90 Base) MCG/ACT Aero Soln inhalation aerosol Inhale 2 Puffs by mouth every 6 hours as needed for Shortness of Breath. 3 Inhaler 1   • traZODone (DESYREL) 100 MG Tab Take 1 Tab by mouth every bedtime. 90 Tab 1   • venlafaxine XR (EFFEXOR XR) 75 MG CAPSULE SR 24 HR Take 1 Cap by mouth every day. 90 Cap 1   • lisinopril-hydrochlorothiazide (PRINZIDE, ZESTORETIC) 20-25 MG per tablet Take 1 Tab by mouth every day. 90 Tab 1   • traZODone (DESYREL) 50 MG Tab Take 1-2 tablets at bedtime as needed 60 Tab 3     No current facility-administered medications for this visit.          Allergies as of 11/14/2018 - Reviewed 11/14/2018   Allergen Reaction Noted   • Erythromycin  06/12/2018   • Prednisone Unspecified 06/12/2018        ROS: As per HPI.    /75 (BP Location: Right arm, Patient Position: Sitting)   Pulse 85   Temp 37.7 °C (99.8 °F)   Resp 16   Ht 1.575 m (5' 2\")   Wt 58.1 kg (128 lb)   SpO2 97%   BMI 23.41 kg/m²     Physical Exam:  Gen:         Alert and oriented, No apparent distress.  HEENT:        No Lymphadenopathy or Bruits.  Neck is supple.  No thyromegaly.  Oropharynx without any erythema or exudate.  Lungs:     Clear to auscultation bilaterally, no wheezes rhonchi or crackles.  Good air excursion  CV:          Regular rate " and rhythm. No murmurs, rubs or gallops.  Abd:         Soft non tender, non distended. Normal active bowel sounds.  No  Hepatosplenomegaly, No pulsatile masses.                   Ext:          No clubbing, cyanosis, edema.      Assessment and Plan.   53 y.o. female with the following issues.    1. Alcohol-induced acute pancreatitis without infection or necrosis  Stable    2. Viral upper respiratory tract infection  Recommend over-the-counter Mucinex, rest, fluid hydration and vitamin C.    3. Medication management    - cetirizine (ZYRTEC) 10 MG Tab; Take 1 Tab by mouth every day.  Dispense: 90 Tab; Refill: 1  - fluticasone (FLONASE) 50 MCG/ACT nasal spray; Spray 2 Sprays in nose every day.  Dispense: 3 Bottle; Refill: 1  - albuterol 108 (90 Base) MCG/ACT Aero Soln inhalation aerosol; Inhale 2 Puffs by mouth every 6 hours as needed for Shortness of Breath.  Dispense: 3 Inhaler; Refill: 1  - traZODone (DESYREL) 100 MG Tab; Take 1 Tab by mouth every bedtime.  Dispense: 90 Tab; Refill: 1  - venlafaxine XR (EFFEXOR XR) 75 MG CAPSULE SR 24 HR; Take 1 Cap by mouth every day.  Dispense: 90 Cap; Refill: 1  - lisinopril-hydrochlorothiazide (PRINZIDE, ZESTORETIC) 20-25 MG per tablet; Take 1 Tab by mouth every day.  Dispense: 90 Tab; Refill: 1    4. Environmental allergies    - cetirizine (ZYRTEC) 10 MG Tab; Take 1 Tab by mouth every day.  Dispense: 90 Tab; Refill: 1  - fluticasone (FLONASE) 50 MCG/ACT nasal spray; Spray 2 Sprays in nose every day.  Dispense: 3 Bottle; Refill: 1  - albuterol 108 (90 Base) MCG/ACT Aero Soln inhalation aerosol; Inhale 2 Puffs by mouth every 6 hours as needed for Shortness of Breath.  Dispense: 3 Inhaler; Refill: 1    5. PND (post-nasal drip)    - fluticasone (FLONASE) 50 MCG/ACT nasal spray; Spray 2 Sprays in nose every day.  Dispense: 3 Bottle; Refill: 1    6. Other insomnia    - traZODone (DESYREL) 100 MG Tab; Take 1 Tab by mouth every bedtime.  Dispense: 90 Tab; Refill: 1    7. Moderate  episode of recurrent major depressive disorder (HCC)    - venlafaxine XR (EFFEXOR XR) 75 MG CAPSULE SR 24 HR; Take 1 Cap by mouth every day.  Dispense: 90 Cap; Refill: 1    8. Essential hypertension    - lisinopril-hydrochlorothiazide (PRINZIDE, ZESTORETIC) 20-25 MG per tablet; Take 1 Tab by mouth every day.  Dispense: 90 Tab; Refill: 1    9. Positive FIT (fecal immunochemical test)  Patient will follow up with gastroenterology in [place]            Please note that this dictation was created using voice recognition software. I have made every reasonable attempt to correct obvious errors, but expect that there are errors of grammar and possible content that I did not discover before finalizing note.